# Patient Record
Sex: FEMALE | Race: BLACK OR AFRICAN AMERICAN | NOT HISPANIC OR LATINO | URBAN - NONMETROPOLITAN AREA
[De-identification: names, ages, dates, MRNs, and addresses within clinical notes are randomized per-mention and may not be internally consistent; named-entity substitution may affect disease eponyms.]

---

## 2022-11-14 PROBLEM — R10.2 PELVIC PAIN: Chronic | Status: CHRONIC | Noted: 2022-11-14

## 2023-04-14 ENCOUNTER — ON CAMPUS - OUTPATIENT (OUTPATIENT)
Dept: URBAN - NONMETROPOLITAN AREA HOSPITAL 34 | Facility: HOSPITAL | Age: 83
End: 2023-04-14

## 2023-04-14 DIAGNOSIS — K22.2 ESOPHAGEAL OBSTRUCTION: ICD-10-CM

## 2023-04-14 PROCEDURE — 43249 ESOPH EGD DILATION <30 MM: CPT | Performed by: INTERNAL MEDICINE

## 2023-11-28 ENCOUNTER — OFFICE (OUTPATIENT)
Dept: URBAN - NONMETROPOLITAN AREA CLINIC 1 | Facility: CLINIC | Age: 83
End: 2023-11-28

## 2023-11-28 VITALS
HEIGHT: 65 IN | WEIGHT: 212 LBS | DIASTOLIC BLOOD PRESSURE: 63 MMHG | SYSTOLIC BLOOD PRESSURE: 100 MMHG | HEART RATE: 77 BPM

## 2023-11-28 DIAGNOSIS — K92.1 MELENA: ICD-10-CM

## 2023-11-28 DIAGNOSIS — K21.9 GASTRO-ESOPHAGEAL REFLUX DISEASE WITHOUT ESOPHAGITIS: ICD-10-CM

## 2023-11-28 DIAGNOSIS — J44.9 CHRONIC OBSTRUCTIVE PULMONARY DISEASE, UNSPECIFIED: ICD-10-CM

## 2023-11-28 DIAGNOSIS — K22.2 ESOPHAGEAL OBSTRUCTION: ICD-10-CM

## 2023-11-28 DIAGNOSIS — R13.10 DYSPHAGIA, UNSPECIFIED: ICD-10-CM

## 2023-11-28 DIAGNOSIS — I73.00 RAYNAUD'S SYNDROME WITHOUT GANGRENE: ICD-10-CM

## 2023-11-28 DIAGNOSIS — I27.20 PULMONARY HYPERTENSION, UNSPECIFIED: ICD-10-CM

## 2023-11-28 DIAGNOSIS — I10 ESSENTIAL (PRIMARY) HYPERTENSION: ICD-10-CM

## 2023-11-28 DIAGNOSIS — I25.10 ATHEROSCLEROTIC HEART DISEASE OF NATIVE CORONARY ARTERY WITH: ICD-10-CM

## 2023-11-28 DIAGNOSIS — Z85.038 PERSONAL HISTORY OF OTHER MALIGNANT NEOPLASM OF LARGE INTEST: ICD-10-CM

## 2023-11-28 DIAGNOSIS — R10.30 LOWER ABDOMINAL PAIN, UNSPECIFIED: ICD-10-CM

## 2023-11-28 DIAGNOSIS — R93.3 ABNORMAL FINDINGS ON DIAGNOSTIC IMAGING OF OTHER PARTS OF DI: ICD-10-CM

## 2023-11-28 PROCEDURE — 99214 OFFICE O/P EST MOD 30 MIN: CPT | Performed by: NURSE PRACTITIONER

## 2023-11-28 NOTE — SERVICENOTES
The risks for EGD, esophageal dilation and colonoscopy were discussed with her, and she agrees to proceed.  
The bowel prep was prescribed and instructions were provided.

## 2023-11-28 NOTE — SERVICEHPINOTES
She presented to the emergency room November 22 with rectal bleeding, abdominal pain and vomiting.  She was prescribed Cipro and Flagyl, but her symptoms did not improve so she returned to  ER.  Her hemoglobin was low at 11.5, WBC elevated to 14.2 and 11.5.   CXR and troponins unrevealing. Dr. Max consulted and she was advised to followup in office.br
br CT abd/ pelv wc 11/22/23-
br FINDINGS:brLiver is unremarkable. Gallbladder decompressed. No biliary ductal dilatation. Spleen is normal in size and configuration. The brpancreas and adrenal glands are unremarkable. Numerous left renal cortical cysts and several small right renal cortical cysts are again noted, better defined on previous contrasted imaging. No hydronephrosis or urolithiasis present. Ureters and bladder are decompressed. Uterus and adnexa are atrophic and age-appropriate. There has been a previous right hemicolectomy. Overall colon demonstrates a paucity of formed stool with fluid extending to the rectum. I suspect there is some mild colonic wall thickening, particularly along the transverse descending and proximal sigmoid colon. A few scattered diverticula are present. Small bowel is normal in caliber and course. Stomach appears unremarkable. No mass or adenopathy. No free air or free fluid. Typical degenerative changes at the hips and spine. Visualized lung bases are clear.brIMPRESSION:brThere is a paucity of formed stool at the colon. Fluid extends to brthe rectum. There are few scattered diverticuli. There is a more brgeneralized pattern of mild diffuse colonic wall thickening and I brsuspect a mild diffuse colitis.brNo further potential acute intra-abdominal or pelvic process bridentified. Stable chronic findings otherwise present as described.
br
br TODAY she still has blood with her stools, and low abdomen discomfort. No diarrhea, fever or nausea. She has a history of cecal cancer and right hemicolectomy 2015.  Her last colonoscopy was done 8/2019 by Dr. Leslie, that showed left side diverticula and small internal hemorrhoids. She is also had trouble with dysphagia and has a history of esophageal strictures. Over the past 2 months, she has had recurrent trouble with swallowing her food and her pills.   Her last EGD/ dilation was done in April by Dr. Montano, and she had good results. Cardiac clearance was obtained at that time.  Her chronic illnesses include hypertension, coronary artery disease, irregular heartbeat, COPD and emphysema, GERD, arthritis, pulmonary hypertension, neuropathy, PVD, Raynaud's disease, former smoker, vertigo, chronic renal insufficiency.br visited="true"

## 2024-03-28 ENCOUNTER — ON CAMPUS - OUTPATIENT (OUTPATIENT)
Dept: URBAN - NONMETROPOLITAN AREA HOSPITAL 34 | Facility: HOSPITAL | Age: 84
End: 2024-03-28
Payer: COMMERCIAL

## 2024-03-28 DIAGNOSIS — Z85.038 PERSONAL HISTORY OF OTHER MALIGNANT NEOPLASM OF LARGE INTEST: ICD-10-CM

## 2024-03-28 DIAGNOSIS — K62.5 HEMORRHAGE OF ANUS AND RECTUM: ICD-10-CM

## 2024-03-28 DIAGNOSIS — R13.10 DYSPHAGIA, UNSPECIFIED: ICD-10-CM

## 2024-03-28 PROCEDURE — 43249 ESOPH EGD DILATION <30 MM: CPT | Mod: 51 | Performed by: INTERNAL MEDICINE

## 2024-03-28 PROCEDURE — 45378 DIAGNOSTIC COLONOSCOPY: CPT | Performed by: INTERNAL MEDICINE
